# Patient Record
Sex: MALE | Race: WHITE | NOT HISPANIC OR LATINO | Employment: OTHER | URBAN - METROPOLITAN AREA
[De-identification: names, ages, dates, MRNs, and addresses within clinical notes are randomized per-mention and may not be internally consistent; named-entity substitution may affect disease eponyms.]

---

## 2018-03-19 ENCOUNTER — OFFICE VISIT CONVERTED (OUTPATIENT)
Dept: SURGERY | Facility: CLINIC | Age: 53
End: 2018-03-19
Attending: UROLOGY

## 2018-04-27 ENCOUNTER — PROCEDURE VISIT CONVERTED (OUTPATIENT)
Dept: SURGERY | Facility: CLINIC | Age: 53
End: 2018-04-27
Attending: UROLOGY

## 2019-01-11 ENCOUNTER — OFFICE VISIT CONVERTED (OUTPATIENT)
Dept: CARDIOLOGY | Facility: CLINIC | Age: 54
End: 2019-01-11
Attending: INTERNAL MEDICINE

## 2019-01-11 ENCOUNTER — CONVERSION ENCOUNTER (OUTPATIENT)
Dept: CARDIOLOGY | Facility: CLINIC | Age: 54
End: 2019-01-11

## 2019-05-13 ENCOUNTER — HOSPITAL ENCOUNTER (OUTPATIENT)
Dept: CT IMAGING | Facility: HOSPITAL | Age: 54
Discharge: HOME | End: 2019-05-13

## 2019-05-13 LAB
CREAT BLD-MCNC: 0.9 MG/DL (ref 0.6–1.4)
GFR SERPLBLD BASED ON 1.73 SQ M-ARVRAT: >60 ML/MIN/{1.73_M2}

## 2019-05-20 ENCOUNTER — PROCEDURE VISIT CONVERTED (OUTPATIENT)
Dept: SURGERY | Facility: CLINIC | Age: 54
End: 2019-05-20
Attending: UROLOGY

## 2019-07-17 ENCOUNTER — CONVERSION ENCOUNTER (OUTPATIENT)
Dept: CARDIOLOGY | Facility: CLINIC | Age: 54
End: 2019-07-17

## 2019-07-17 ENCOUNTER — OFFICE VISIT CONVERTED (OUTPATIENT)
Dept: CARDIOLOGY | Facility: CLINIC | Age: 54
End: 2019-07-17
Attending: INTERNAL MEDICINE

## 2019-10-01 ENCOUNTER — PROCEDURE VISIT CONVERTED (OUTPATIENT)
Dept: SURGERY | Facility: CLINIC | Age: 54
End: 2019-10-01
Attending: UROLOGY

## 2019-10-01 ENCOUNTER — HOSPITAL ENCOUNTER (OUTPATIENT)
Dept: SURGERY | Facility: CLINIC | Age: 54
Discharge: HOME | End: 2019-10-01

## 2020-01-30 ENCOUNTER — OFFICE VISIT CONVERTED (OUTPATIENT)
Dept: CARDIOLOGY | Facility: CLINIC | Age: 55
End: 2020-01-30
Attending: INTERNAL MEDICINE

## 2020-03-10 ENCOUNTER — OFFICE VISIT CONVERTED (OUTPATIENT)
Dept: UROLOGY | Facility: CLINIC | Age: 55
End: 2020-03-10
Attending: UROLOGY

## 2020-10-20 ENCOUNTER — PROCEDURE VISIT CONVERTED (OUTPATIENT)
Dept: UROLOGY | Facility: CLINIC | Age: 55
End: 2020-10-20
Attending: UROLOGY

## 2020-10-20 ENCOUNTER — HOSPITAL ENCOUNTER (OUTPATIENT)
Dept: SURGERY | Facility: CLINIC | Age: 55
Discharge: HOME | End: 2020-10-20
Attending: UROLOGY

## 2021-02-04 ENCOUNTER — OFFICE VISIT CONVERTED (OUTPATIENT)
Dept: CARDIOLOGY | Facility: CLINIC | Age: 56
End: 2021-02-04
Attending: INTERNAL MEDICINE

## 2021-05-10 NOTE — PROCEDURES
Procedure Note      Patient Name: Mahesh Cole   Patient ID: [id number]   Sex: Male   YOB: 1965    Primary Care Provider: Velma KELLEY   Referring Provider: Velma KELLEY    Visit Date: October 20, 2020    Provider: Bentley Araujo MD   Location: [facility]   Location Address: [address] [place] [address]  [id number]   Location Phone: [phone]          Cystoscopy Procedure:  The patients urine was viewe d under a microscope during his clinical visit: no RBC present, no WBC present, no Bacteria present.          PROCEDURE: Flexible cystoscope was passed per urethra into the bladder without difficulty after proper consent.     2cm  prostate    Some scar tissue posteriorly.  Normal otherwise    The bladder was inspected in a systematic meridian fashion. There were no tumors, lesions, stones, or other abnormalities noted within the bladder. Of note, there was no increased vascularity as well. Both ureteral orifices were identified and were normal in appearance. The flexible cystoscope was removed. The patient tolerated the procedure well.    History of high risk TCC    12/19 CT abdomen/pelvis with and withoutleft adrenal myolipoma 2.7 cm.  Negative otherwise  10/19 cystoscopynegative  5/19 CT urogram2.7 cm left myelo lipoma.  Negative otherwise    8/13  pTahigh-grade TCC  4/13  pTa high-grade TCC  2012 pT1 high-grade TCCno muscle    Patient has had 3 induction courses of BCG in the past.    Non-smoker           Assessment  · History of bladder cancer     V10.51/Z85.51      Plan  · Orders  o Cystoscopy (08016) - V10.51/Z85.51 - 10/20/2020  o Urine cytology (16307) - V10.51/Z85.51 - 10/20/2020  · Medications  o Medications have been Reconciled  o Transition of Care or Provider Policy  · Instructions  o We will follow up in one year or sooner if needed.  o Electronically Identified Patient Education Materials Provided Electronically       Urine cytology  today    Follow-up in 1 year for office cystoscopy             Electronically Signed by: Bentley Araujo MD -Author on October 20, 2020 10:57:57 AM

## 2021-05-14 VITALS
SYSTOLIC BLOOD PRESSURE: 132 MMHG | BODY MASS INDEX: 35.78 KG/M2 | HEIGHT: 73 IN | DIASTOLIC BLOOD PRESSURE: 84 MMHG | WEIGHT: 270 LBS | HEART RATE: 76 BPM

## 2021-05-14 VITALS — BODY MASS INDEX: 35.78 KG/M2 | RESPIRATION RATE: 18 BRPM | WEIGHT: 270 LBS | HEIGHT: 73 IN

## 2021-05-14 NOTE — PROGRESS NOTES
"   Progress Note      Patient Name: Mahesh Cole   Patient ID: [id number]   Sex: Male   YOB: 1965    Primary Care Provider: Velma KELLEY   Referring Provider: Velma KELLEY    Visit Date: February 4, 2021    Provider: Stefan Swanson MD   Location: [facility]   Location Address: [address] [place] [address], [address]   [place]  [id number]   Location Phone: [phone]          Chief Complaint     PVCs.       History Of Present Illness  REFERRING CARE PROVIDER: Velma KELLEY   Mahesh Cole is a 55 year old /White male with history of hypertension and PVCs. He is symptomatically stable, no complaints.   PAST MEDICAL HISTORY: Positive for hypertension and PVCs.   PSYCHOSOCIAL HISTORY: Denies alcohol use. Denies tobacco use.   CURRENT MEDICATIONS: Medications have been reviewed and are as stated.      ALLERGIES: No known drug allergies.       Review of Systems  · Cardiovascular  o Denies  o : palpitations (fast, fluttering, or skipping beats), swelling (feet, ankles, hands), shortness of breath while walking or lying flat, chest pain or angina pectoris   · Respiratory  o Denies  o : chronic or frequent cough      Vitals  Date Time BP Position Site L\R Cuff Size HR RR TEMP (F) WT  HT  BMI kg/m2 BSA m2 O2 Sat FR L/min FiO2 HC       02/04/2021 08:39 /84 Sitting    76 - R   270lbs 0oz 6'  1\" 35.62 2.51             Physical Examination  · Constitutional  o Appearance  o : Awake, alert, in no acute distress.   · Eyes  o Conjunctivae  o : Normal.  · Ears, Nose, Mouth and Throat  o Oral Cavity  o :   § Oral Mucosa  § : Normal.  · Neck  o Inspection/Palpation  o : No JVD. Good carotid upstroke. No thyromegaly.  · Respiratory  o Respiratory  o : Good respiratory effort. Clear to percussion and auscultation.  · Cardiovascular  o Heart  o :   § Auscultation of Heart  § : S1, S2 normal. Regular rate and rhythm without murmurs, gallops, or rubs.  o Peripheral Vascular " System  o :   § Extremities  § : Good femoral and pedal pulses. No pedal edema.  · Gastrointestinal  o Abdominal Examination  o : Soft. No tenderness or masses felt. No hepatosplenomegaly. Abdominal aorta is not palpable.  · EKG  o EKG  o : Performed in the office today.  o Indications  o : PVCs.  o Results  o : Normal sinus rhythm.  o Comparison  o : No change from prior EKGs.          Assessment     ASSESSMENT & PLAN:    PVCs, symptomatically stable.  Continue with current beta blocker and calcium channel blocker.  Encouraged exercise and weight loss.     Stefan Swanson MD  JH/rt             Electronically Signed by: Liliam Pickett-, Other -Author on February 19, 2021 09:20:28 AM  Electronically Co-signed by: Stefan Swanson MD -Reviewer on February 26, 2021 11:03:27 AM

## 2021-05-15 VITALS
BODY MASS INDEX: 36.18 KG/M2 | SYSTOLIC BLOOD PRESSURE: 138 MMHG | WEIGHT: 273 LBS | DIASTOLIC BLOOD PRESSURE: 102 MMHG | HEIGHT: 73 IN | HEART RATE: 76 BPM

## 2021-05-15 VITALS — HEIGHT: 73 IN | RESPIRATION RATE: 17 BRPM | WEIGHT: 270.25 LBS | BODY MASS INDEX: 35.82 KG/M2

## 2021-05-15 VITALS
HEART RATE: 74 BPM | HEIGHT: 73 IN | BODY MASS INDEX: 35.78 KG/M2 | DIASTOLIC BLOOD PRESSURE: 84 MMHG | WEIGHT: 270 LBS | SYSTOLIC BLOOD PRESSURE: 132 MMHG

## 2021-05-16 VITALS
HEIGHT: 73 IN | WEIGHT: 276 LBS | HEART RATE: 74 BPM | DIASTOLIC BLOOD PRESSURE: 96 MMHG | SYSTOLIC BLOOD PRESSURE: 132 MMHG | BODY MASS INDEX: 36.58 KG/M2

## 2021-05-16 VITALS
HEIGHT: 73 IN | BODY MASS INDEX: 35.39 KG/M2 | WEIGHT: 267 LBS | SYSTOLIC BLOOD PRESSURE: 128 MMHG | DIASTOLIC BLOOD PRESSURE: 72 MMHG

## 2021-11-29 PROBLEM — N28.9 RENAL LESION: Status: ACTIVE | Noted: 2021-11-29

## 2021-11-29 PROBLEM — Z85.51 HISTORY OF BLADDER CANCER: Status: ACTIVE | Noted: 2021-11-29

## 2021-11-29 NOTE — PROGRESS NOTES
Cystoscopy Procedure:  The patients urine was viewe d under a microscope during his clinical visit: no RBC present, no WBC present, no Bacteria present.           PROCEDURE: Flexible cystoscope was passed per urethra into the bladder without difficulty after proper consent.     2cm  prostate    Some scar tissue posteriorly.    No signs of pathology.    The bladder was inspected in a systematic meridian fashion. There were no tumors, lesions, stones, or other abnormalities noted within the bladder. Of note, there was no increased vascularity as well. Both ureteral orifices were identified and were normal in appearance. The flexible cystoscope was removed. The patient tolerated the procedure well.        History of high risk TCC    12/19 CT abdomen/pelvis with and withoutleft adrenal myolipoma 2.7 cm.  Negative otherwise  10/19 cystoscopynegative  5/19 CT urogram2.7 cm left myelo lipoma.  Negative otherwise    8/13  pTahigh-grade TCC  4/13  pTa high-grade TCC  2012 pT1 high-grade TCCno muscle    Patient has had 3 induction courses of BCG in the past.    Non-smoker     PLAN      Urine cytology today    Follow-up in 1 year for office cystoscopy with CT urogram before

## 2021-11-30 ENCOUNTER — OFFICE VISIT (OUTPATIENT)
Dept: UROLOGY | Facility: CLINIC | Age: 56
End: 2021-11-30

## 2021-11-30 DIAGNOSIS — Z85.51 HISTORY OF BLADDER CANCER: Primary | ICD-10-CM

## 2021-11-30 LAB
BILIRUB BLD-MCNC: NEGATIVE MG/DL
CLARITY, POC: CLEAR
COLOR UR: YELLOW
EXPIRATION DATE: ABNORMAL
GLUCOSE UR STRIP-MCNC: NEGATIVE MG/DL
KETONES UR QL: NEGATIVE
LEUKOCYTE EST, POC: NEGATIVE
Lab: ABNORMAL
NITRITE UR-MCNC: NEGATIVE MG/ML
PH UR: 6.5 [PH] (ref 5–8)
PROT UR STRIP-MCNC: NEGATIVE MG/DL
RBC # UR STRIP: NEGATIVE /UL
SP GR UR: 1.02 (ref 1–1.03)
UROBILINOGEN UR QL: ABNORMAL

## 2021-11-30 PROCEDURE — 88108 CYTOPATH CONCENTRATE TECH: CPT | Performed by: UROLOGY

## 2021-11-30 PROCEDURE — 52000 CYSTOURETHROSCOPY: CPT | Performed by: UROLOGY

## 2021-11-30 PROCEDURE — 81003 URINALYSIS AUTO W/O SCOPE: CPT | Performed by: UROLOGY

## 2021-11-30 RX ORDER — DILTIAZEM HYDROCHLORIDE 60 MG/1
CAPSULE, EXTENDED RELEASE ORAL
COMMUNITY
End: 2022-02-08 | Stop reason: SDUPTHER

## 2021-11-30 RX ORDER — CETIRIZINE HYDROCHLORIDE 10 MG/1
TABLET ORAL
COMMUNITY

## 2021-11-30 RX ORDER — DILTIAZEM HYDROCHLORIDE 180 MG/1
CAPSULE, EXTENDED RELEASE ORAL
COMMUNITY
Start: 2021-03-25 | End: 2023-02-08 | Stop reason: SDUPTHER

## 2021-11-30 RX ORDER — METOPROLOL TARTRATE 50 MG/1
50 TABLET, FILM COATED ORAL
COMMUNITY

## 2021-11-30 RX ORDER — HYDROCHLOROTHIAZIDE 12.5 MG/1
TABLET ORAL
COMMUNITY
End: 2023-03-29 | Stop reason: SDUPTHER

## 2021-12-02 LAB
CYTO UR: NORMAL
LAB AP CASE REPORT: NORMAL
LAB AP CLINICAL INFORMATION: NORMAL
LAB AP NON-GYN INTERPRETATION: NORMAL
PATH REPORT.FINAL DX SPEC: NORMAL
PATH REPORT.GROSS SPEC: NORMAL
STAT OF ADQ CVX/VAG CYTO-IMP: NORMAL

## 2021-12-16 ENCOUNTER — HOSPITAL ENCOUNTER (OUTPATIENT)
Dept: CT IMAGING | Facility: HOSPITAL | Age: 56
Discharge: HOME | End: 2021-12-16
Admitting: UROLOGY

## 2021-12-16 DIAGNOSIS — Z85.51 HISTORY OF BLADDER CANCER: ICD-10-CM

## 2021-12-16 LAB — CREAT BLDA-MCNC: 0.9 MG/DL

## 2021-12-16 PROCEDURE — 82565 ASSAY OF CREATININE: CPT

## 2021-12-16 PROCEDURE — 74178 CT ABD&PLV WO CNTR FLWD CNTR: CPT

## 2021-12-16 PROCEDURE — 0 IOPAMIDOL PER 1 ML: Performed by: UROLOGY

## 2021-12-16 RX ADMIN — IOPAMIDOL 100 ML: 755 INJECTION, SOLUTION INTRAVENOUS at 14:00

## 2021-12-20 ENCOUNTER — TELEPHONE (OUTPATIENT)
Dept: UROLOGY | Facility: CLINIC | Age: 56
End: 2021-12-20

## 2021-12-20 NOTE — TELEPHONE ENCOUNTER
----- Message from Bentley Araujo MD sent at 12/20/2021  7:05 AM EST -----  Let patient know his CT looked okay, I will see him at his follow-up appointment

## 2022-02-03 PROBLEM — I49.3 PVC (PREMATURE VENTRICULAR CONTRACTION): Status: ACTIVE | Noted: 2022-02-03

## 2022-02-08 ENCOUNTER — OFFICE VISIT (OUTPATIENT)
Dept: CARDIOLOGY | Facility: CLINIC | Age: 57
End: 2022-02-08

## 2022-02-08 VITALS
WEIGHT: 262 LBS | SYSTOLIC BLOOD PRESSURE: 124 MMHG | HEART RATE: 79 BPM | BODY MASS INDEX: 34.72 KG/M2 | HEIGHT: 73 IN | DIASTOLIC BLOOD PRESSURE: 86 MMHG

## 2022-02-08 DIAGNOSIS — I10 ESSENTIAL HYPERTENSION: ICD-10-CM

## 2022-02-08 DIAGNOSIS — I49.3 PVC (PREMATURE VENTRICULAR CONTRACTION): Primary | ICD-10-CM

## 2022-02-08 PROCEDURE — 99214 OFFICE O/P EST MOD 30 MIN: CPT | Performed by: INTERNAL MEDICINE

## 2022-02-08 RX ORDER — DIPHENOXYLATE HYDROCHLORIDE AND ATROPINE SULFATE 2.5; .025 MG/1; MG/1
TABLET ORAL
COMMUNITY

## 2022-02-08 NOTE — PROGRESS NOTES
"Chief Complaint  PVC's, Hypertension, and Obesity    Subjective    Patient is doing well no active complaints or new problems heart palpitations  Past Medical History:   Diagnosis Date   • Hx of bladder cancer    • Hypertension    • PVC (premature ventricular contraction)    • Renal lesion          Current Outpatient Medications:   •  cetirizine (ZyrTEC Allergy) 10 MG tablet, Zyrtec 10 mg oral tablet take 1 tablet (10 mg) by oral route once daily   Active, Disp: , Rfl:   •  dilTIAZem (TIAZAC) 180 MG 24 hr capsule, diltiazem HCl 180 mg oral capsule,extended release 24 hr take 1 capsule (180 mg) by oral route once daily 3/25/2021  Active, Disp: , Rfl:   •  hydroCHLOROthiazide (HYDRODIURIL) 12.5 MG tablet, hydrochlorothiazide 12.5 mg oral tablet take 1 tablet (12.5 mg) by oral route once daily   Suspended, Disp: , Rfl:   •  metFORMIN (GLUCOPHAGE) 850 MG tablet, metformin 850 mg oral tablet take 1 tablet (850 mg) by oral route 2 times per day with morning and evening meals   Active, Disp: , Rfl:   •  metoprolol tartrate (LOPRESSOR) 50 MG tablet, Take 50 mg by mouth every night at bedtime., Disp: , Rfl:   •  multivitamin (Multi-Vitamin Daily) tablet tablet, , Disp: , Rfl:   •  Omega-3 Fatty Acids (FISH OIL PO), , Disp: , Rfl:   •  vitamin D3 125 MCG (5000 UT) capsule capsule, TAKE ONE CAPSULE BY MOUTH ONCE A DAY, Disp: , Rfl:     Medications Discontinued During This Encounter   Medication Reason   • dilTIAZem SR (CARDIZEM SR) 60 MG 12 hr capsule Duplicate order     No Known Allergies     Social History     Tobacco Use   • Smoking status: Never Smoker   • Smokeless tobacco: Never Used   Vaping Use   • Vaping Use: Never used   Substance Use Topics   • Alcohol use: Never   • Drug use: Never       History reviewed. No pertinent family history.     Objective     /86   Pulse 79   Ht 185.4 cm (73\")   Wt 119 kg (262 lb)   BMI 34.57 kg/m²       Physical Exam    General Appearance:   · no acute distress  · Alert and " oriented x3  HENT:   · lips not cyanotic  · Atraumatic  Neck:  · No jvd   · supple  Respiratory:  · no respiratory distress  · normal breath sounds  · no rales  Cardiovascular:  · Regular rate and rhythm  · no S3, no S4   · no murmur  · no rub  Extremities  · No cyanosis  · lower extremity edema: none    Skin:   · warm, dry  · No rashes      Result Review :     No results found for: PROBNP  CMP    CMP 12/16/21   Creatinine 0.90      Comments are available for some flowsheets but are not being displayed.             Diagnoses and all orders for this visit:    1. PVC (premature ventricular contraction) (Primary)  Assessment & Plan:  Patient symptomatic control continue with diltiazem 180 and Lopressor 50 twice daily dosing      2. Essential hypertension  Assessment & Plan:  Blood pressure well controlled continue with diltiazem 180 daily and metoprolol 50 twice daily  Counseled patient on  • low-sodium diet of less than 2 g  • Aerobic activity 30 minutes a day 5 times a week  • Weight loss                Follow Up     Return in about 1 year (around 2/8/2023) for Follow with Shanae Choudhary, EKG with F/U.          Patient was given instructions and counseling regarding his condition or for health maintenance advice. Please see specific information pulled into the AVS if appropriate.

## 2022-02-08 NOTE — ASSESSMENT & PLAN NOTE
Blood pressure well controlled continue with diltiazem 180 daily and metoprolol 50 twice daily  Counseled patient on  • low-sodium diet of less than 2 g  • Aerobic activity 30 minutes a day 5 times a week  • Weight loss

## 2022-10-21 DIAGNOSIS — N28.9 RENAL LESION: Primary | ICD-10-CM

## 2022-11-10 ENCOUNTER — HOSPITAL ENCOUNTER (OUTPATIENT)
Dept: CT IMAGING | Facility: HOSPITAL | Age: 57
Discharge: HOME | End: 2022-11-10
Admitting: UROLOGY

## 2022-11-10 DIAGNOSIS — N28.9 RENAL LESION: ICD-10-CM

## 2022-11-10 LAB
CREAT BLDA-MCNC: 0.8 MG/DL
EGFRCR SERPLBLD CKD-EPI 2021: 102.6 ML/MIN/1.73

## 2022-11-10 PROCEDURE — 74178 CT ABD&PLV WO CNTR FLWD CNTR: CPT

## 2022-11-10 PROCEDURE — 0 IOPAMIDOL PER 1 ML: Performed by: UROLOGY

## 2022-11-10 PROCEDURE — 82565 ASSAY OF CREATININE: CPT

## 2022-11-10 RX ADMIN — IOPAMIDOL 100 ML: 755 INJECTION, SOLUTION INTRAVENOUS at 15:50

## 2022-12-14 ENCOUNTER — TELEPHONE (OUTPATIENT)
Dept: UROLOGY | Facility: CLINIC | Age: 57
End: 2022-12-14

## 2022-12-14 NOTE — TELEPHONE ENCOUNTER
----- Message from James Yoon RN sent at 12/14/2022  8:32 AM EST -----  WILL YOU SEE IF PT WILL MOVE TO 0930 FRIDAY

## 2022-12-15 NOTE — PROGRESS NOTES
Cystoscopy Procedure:  The patients urine was viewe d under a microscope during his clinical visit: no RBC present, no WBC present, no Bacteria present.           PROCEDURE: Flexible cystoscope was passed per urethra into the bladder without difficulty after proper consent.     2cm  prostate    Some scar tissue posteriorly.    No signs of pathology.    No changes from his last scope.    The bladder was inspected in a systematic meridian fashion. There were no tumors, lesions, stones, or other abnormalities noted within the bladder. Of note, there was no increased vascularity as well. Both ureteral orifices were identified and were normal in appearance. The flexible cystoscope was removed. The patient tolerated the procedure well.        History of high risk TCC    11/10/2022 CT uro-- 2 mm nonobstructing stone upper pole right kidney, delayed phase okay, images reviewed    12/21 CT urogram  -delayed phase okay, negative,       12/19 CT abdomen/pelvis with and withoutleft adrenal myolipoma 2.7 cm.  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  Negative otherwise    8/13  pTahigh-grade TCC  4/13  pTa high-grade TCC  2012 pT1 high-grade TCCno muscle    Patient has had 3 induction courses of BCG in the past.    Non-smoker     PLAN      Follow-up in 1 year with cystoscopy

## 2022-12-16 ENCOUNTER — PROCEDURE VISIT (OUTPATIENT)
Dept: UROLOGY | Facility: CLINIC | Age: 57
End: 2022-12-16

## 2022-12-16 DIAGNOSIS — Z85.51 HISTORY OF BLADDER CANCER: Primary | ICD-10-CM

## 2022-12-16 PROCEDURE — 52000 CYSTOURETHROSCOPY: CPT | Performed by: UROLOGY

## 2023-02-08 ENCOUNTER — OFFICE VISIT (OUTPATIENT)
Dept: OTOLARYNGOLOGY | Facility: CLINIC | Age: 58
End: 2023-02-08
Payer: COMMERCIAL

## 2023-02-08 ENCOUNTER — OFFICE VISIT (OUTPATIENT)
Dept: CARDIOLOGY | Facility: CLINIC | Age: 58
End: 2023-02-08
Payer: COMMERCIAL

## 2023-02-08 VITALS
BODY MASS INDEX: 34.33 KG/M2 | HEIGHT: 73 IN | HEART RATE: 76 BPM | SYSTOLIC BLOOD PRESSURE: 147 MMHG | WEIGHT: 259 LBS | DIASTOLIC BLOOD PRESSURE: 90 MMHG

## 2023-02-08 VITALS — TEMPERATURE: 97.9 F | BODY MASS INDEX: 34.33 KG/M2 | WEIGHT: 259 LBS | HEIGHT: 73 IN

## 2023-02-08 DIAGNOSIS — H93.8X1 EAR PRESSURE, RIGHT: Primary | ICD-10-CM

## 2023-02-08 DIAGNOSIS — R51.9 RIGHT SIDED FACIAL PAIN: ICD-10-CM

## 2023-02-08 DIAGNOSIS — I10 ESSENTIAL HYPERTENSION: ICD-10-CM

## 2023-02-08 DIAGNOSIS — R44.8 FACIAL PRESSURE: ICD-10-CM

## 2023-02-08 DIAGNOSIS — I49.3 PVC (PREMATURE VENTRICULAR CONTRACTION): Primary | ICD-10-CM

## 2023-02-08 PROBLEM — J30.9 ALLERGIC RHINITIS: Status: ACTIVE | Noted: 2023-02-08

## 2023-02-08 PROBLEM — E11.9 DIABETES MELLITUS, TYPE II: Status: ACTIVE | Noted: 2023-02-08

## 2023-02-08 PROCEDURE — 99204 OFFICE O/P NEW MOD 45 MIN: CPT | Performed by: NURSE PRACTITIONER

## 2023-02-08 PROCEDURE — 99214 OFFICE O/P EST MOD 30 MIN: CPT | Performed by: NURSE PRACTITIONER

## 2023-02-08 RX ORDER — DILTIAZEM HYDROCHLORIDE 180 MG/1
180 CAPSULE, EXTENDED RELEASE ORAL DAILY
Qty: 90 CAPSULE | Refills: 3 | Status: SHIPPED | OUTPATIENT
Start: 2023-02-08

## 2023-02-08 RX ORDER — NAPROXEN 500 MG/1
500 TABLET ORAL 2 TIMES DAILY PRN
COMMUNITY
Start: 2023-01-09

## 2023-02-08 NOTE — PROGRESS NOTES
Patient Name: Mahesh Cole   Visit Date: 02/08/2023   Patient ID: [id number]  Provider: WARREN Li    Sex: male  Location: [facility]   YOB: 1965  Location Address: [address] [place] [address], [address],?[place]?[id number]    Primary Care Provider Velma Santos APRN  Location Phone: [phone]    Referring Provider: WARREN Plasencia        Chief Complaint  Ear Problem    Subjective   Mahesh Cole is a 58 y.o. male who presents to [facility] today as a consult from WARREN Plasencia for evaluation of his right ear.  Patient states that around the end of October he began to have pain and pressure in the right ear with pain that would radiate down the right side of his neck.  He states that he has had issues with fluid buildup on the right ear in the past but is typically treated with antibiotics and steroids and this clears up quickly.  However this time it has persisted.  He states that he is most bothered by the pain and pressure that occurs anytime he sneezes, coughs or bends over.  He describes this as pressure and pain in the right ear that radiates across to the right side of his head through his forehead and right maxillary sinus area.  He states he tries to avoid sneezing because of this pain and pressure.  He reports he has never experienced this before.  He has had some rhinorrhea that is mainly clear with a 1 week period in December at that it was dark-colored.  He states he has been treated with antibiotics but continues to have the symptoms in the ER.  He does feel like his hearing is potentially muffled on that right side but overall feels like he hears okay.  He denies any difficulty breathing, nasal obstruction issues or TMJ issues.      Current Outpatient Medications on File Prior to Visit   Medication Sig   • cetirizine (zyrTEC) 10 MG tablet Zyrtec 10 mg oral tablet take 1 tablet (10 mg) by oral route  "once daily   Active   • dilTIAZem (TIAZAC) 180 MG 24 hr capsule Take 1 capsule by mouth Daily.   • hydroCHLOROthiazide (HYDRODIURIL) 12.5 MG tablet hydrochlorothiazide 12.5 mg oral tablet take 1 tablet (12.5 mg) by oral route once daily   Suspended   • metFORMIN (GLUCOPHAGE) 850 MG tablet metformin 850 mg oral tablet take 1 tablet (850 mg) by oral route 2 times per day with morning and evening meals   Active   • metoprolol tartrate (LOPRESSOR) 50 MG tablet Take 50 mg by mouth every night at bedtime.   • multivitamin (THERAGRAN) tablet tablet    • naproxen (NAPROSYN) 500 MG tablet Take 500 mg by mouth 2 (Two) Times a Day As Needed. for pain   • Omega-3 Fatty Acids (FISH OIL PO)    • vitamin D3 125 MCG (5000 UT) capsule capsule TAKE ONE CAPSULE BY MOUTH ONCE A DAY     No current facility-administered medications on file prior to visit.        Social History     Tobacco Use   • Smoking status: Never   • Smokeless tobacco: Never   Vaping Use   • Vaping Use: Never used   Substance Use Topics   • Alcohol use: Never   • Drug use: Never       Objective     Vital Signs:   Temp 97.9 °F (36.6 °C) (Temporal)   Ht 185.4 cm (73\")   Wt 117 kg (259 lb)   BMI 34.17 kg/m²       Physical Exam  Constitutional:       General: He is not in acute distress.     Appearance: Normal appearance. He is not ill-appearing.   HENT:      Head: Normocephalic and atraumatic.      Jaw: There is normal jaw occlusion. No tenderness or pain on movement.      Salivary Glands: Right salivary gland is not diffusely enlarged or tender. Left salivary gland is not diffusely enlarged or tender.      Right Ear: Tympanic membrane, ear canal and external ear normal.      Left Ear: Tympanic membrane, ear canal and external ear normal.      Nose: Nose normal. No septal deviation.      Right Sinus: No maxillary sinus tenderness or frontal sinus tenderness.      Left Sinus: No maxillary sinus tenderness or frontal sinus tenderness.      Mouth/Throat:      Lips: " Pink. No lesions.      Mouth: Mucous membranes are moist. No oral lesions.      Dentition: Normal dentition.      Tongue: No lesions.      Palate: No mass and lesions.      Pharynx: Oropharynx is clear. Uvula midline.      Tonsils: No tonsillar exudate.   Eyes:      Extraocular Movements: Extraocular movements intact.      Conjunctiva/sclera: Conjunctivae normal.      Pupils: Pupils are equal, round, and reactive to light.   Neck:      Thyroid: No thyroid mass, thyromegaly or thyroid tenderness.      Trachea: Trachea normal.   Pulmonary:      Effort: Pulmonary effort is normal. No respiratory distress.   Musculoskeletal:         General: Normal range of motion.      Cervical back: Normal range of motion and neck supple. No tenderness.   Lymphadenopathy:      Cervical: No cervical adenopathy.   Skin:     General: Skin is warm and dry.   Neurological:      General: No focal deficit present.      Mental Status: He is alert and oriented to person, place, and time.      Cranial Nerves: No cranial nerve deficit.      Motor: No weakness.      Gait: Gait normal.   Psychiatric:         Mood and Affect: Mood normal.         Behavior: Behavior normal.         Thought Content: Thought content normal.         Judgment: Judgment normal.               Result Review :              Assessment and Plan    Diagnoses and all orders for this visit:    1. Ear pressure, right (Primary)    2. Facial pressure  -     CT Maxillofacial Without Contrast; Future    3. Right sided facial pain  -     CT Maxillofacial Without Contrast; Future    On examination today the right middle ear appears well aerated.  He does not have any sign of fluid or infection.  I suspect he may be having eustachian tube dysfunction issues but due to his symptoms of extreme facial pain and pressure radiating across the maxillary sinuses and forehead I would like to get a CT scan of the sinuses for further evaluation.  Additionally when he returns we will consider  audiogram and tympanogram testing as well.  I will have him use his nasal spray and polarization techniques to try to open up the eustachian tube to see if this helps with his symptoms.       Follow Up   No follow-ups on file.  Patient was given instructions and counseling regarding his condition or for health maintenance advice. Please see specific information pulled into the AVS if appropriate.      Saira Tenorio, APRN

## 2023-02-08 NOTE — PROGRESS NOTES
Chief Complaint  Follow-up and Hypertension    Subjective            History of Present Illness  Mahesh Cole is a 58-year-old white/ male patient who presents to the office today for follow-up.  He has hypertension and PVCs.  He reports compliance with his medications. He has been battling an ongoing ear infection for the past month, has been referred to ENT and is actually going to be seen later on today. He is attributing his elevated blood pressure today to this ongoing issue.   He denies any chest pain, shortness of breath, lightheadedness/dizziness, palpitations, or edema.     PMH  Past Medical History:   Diagnosis Date   • Hx of bladder cancer    • Hypertension    • PVC (premature ventricular contraction)    • Renal lesion          ALLERGY  No Known Allergies       SURGICALHX  Past Surgical History:   Procedure Laterality Date   • BLADDER SURGERY            SOC  Social History     Socioeconomic History   • Marital status:    Tobacco Use   • Smoking status: Never   • Smokeless tobacco: Never   Vaping Use   • Vaping Use: Never used   Substance and Sexual Activity   • Alcohol use: Never   • Drug use: Never         FAMHX  History reviewed. No pertinent family history.       MEDSIGONLY  Current Outpatient Medications on File Prior to Visit   Medication Sig   • cetirizine (zyrTEC) 10 MG tablet Zyrtec 10 mg oral tablet take 1 tablet (10 mg) by oral route once daily   Active   • dilTIAZem (TIAZAC) 180 MG 24 hr capsule diltiazem HCl 180 mg oral capsule,extended release 24 hr take 1 capsule (180 mg) by oral route once daily 3/25/2021  Active   • hydroCHLOROthiazide (HYDRODIURIL) 12.5 MG tablet hydrochlorothiazide 12.5 mg oral tablet take 1 tablet (12.5 mg) by oral route once daily   Suspended   • metFORMIN (GLUCOPHAGE) 850 MG tablet metformin 850 mg oral tablet take 1 tablet (850 mg) by oral route 2 times per day with morning and evening meals   Active   • metoprolol tartrate (LOPRESSOR) 50 MG tablet  "Take 50 mg by mouth every night at bedtime.   • multivitamin (THERAGRAN) tablet tablet    • naproxen (NAPROSYN) 500 MG tablet Take 500 mg by mouth 2 (Two) Times a Day As Needed. for pain   • Omega-3 Fatty Acids (FISH OIL PO)    • vitamin D3 125 MCG (5000 UT) capsule capsule TAKE ONE CAPSULE BY MOUTH ONCE A DAY     No current facility-administered medications on file prior to visit.       Objective   /90   Pulse 76   Ht 185.4 cm (73\")   Wt 117 kg (259 lb)   BMI 34.17 kg/m²       Physical Exam  Constitutional:       Appearance: He is obese.   HENT:      Head: Normocephalic.   Neck:      Vascular: No carotid bruit.   Cardiovascular:      Rate and Rhythm: Normal rate and regular rhythm.      Pulses: Normal pulses.      Heart sounds: Normal heart sounds. No murmur heard.  Pulmonary:      Effort: Pulmonary effort is normal.      Breath sounds: Normal breath sounds.   Musculoskeletal:      Cervical back: Neck supple.      Right lower leg: No edema.      Left lower leg: No edema.   Skin:     General: Skin is dry.      Capillary Refill: Capillary refill takes less than 2 seconds.   Neurological:      Mental Status: He is alert and oriented to person, place, and time.   Psychiatric:         Behavior: Behavior normal.       Result Review :   The following data was reviewed by: WARREN Malagon on 02/08/2023:  No results found for: PROBNP  CMP    CMP 11/10/22   Creatinine 0.80   eGFR 102.6      Comments are available for some flowsheets but are not being displayed.            No results found for: TSH   No results found for: FREET4   No results found for: DDIMERQUANT  No results found for: MG   No results found for: DIGOXIN   No results found for: TROPONINT               Assessment and Plan    Diagnoses and all orders for this visit:    1. PVC (premature ventricular contraction) (Primary)  Symptomatically stable, without recurrent events, continue diltiazem 180 mg daily and metoprolol 50 mg nightly. Avoid " caffeine products.     2. Essential hypertension  Elevated today, attributed to his month long ear infection. I asked him once ear infection has resolved to monitor blood pressure closely and notify office if persistently out of range.   Continue hydrochlorothiazide 12.5 mg daily.     Other orders  -     dilTIAZem (TIAZAC) 180 MG 24 hr capsule; Take 1 capsule by mouth Daily.  Dispense: 90 capsule; Refill: 3            Follow Up   Return in about 1 year (around 2/8/2024) for Follow up with Dr Swanson.    Patient was given instructions and counseling regarding his condition or for health maintenance advice. Please see specific information pulled into the AVS if appropriate.     Mahesh CHIKIS Cole  reports that he has never smoked. He has never used smokeless tobacco.         Shanae Choudhary, APRN  02/08/23  08:13 EST    Dictated Utilizing Dragon Dictation

## 2023-03-15 ENCOUNTER — HOSPITAL ENCOUNTER (OUTPATIENT)
Dept: CT IMAGING | Facility: HOSPITAL | Age: 58
Discharge: HOME | End: 2023-03-15
Admitting: NURSE PRACTITIONER
Payer: COMMERCIAL

## 2023-03-15 DIAGNOSIS — R44.8 FACIAL PRESSURE: ICD-10-CM

## 2023-03-15 DIAGNOSIS — R51.9 RIGHT SIDED FACIAL PAIN: ICD-10-CM

## 2023-03-15 PROCEDURE — 70486 CT MAXILLOFACIAL W/O DYE: CPT

## 2023-03-29 ENCOUNTER — OFFICE VISIT (OUTPATIENT)
Dept: OTOLARYNGOLOGY | Facility: CLINIC | Age: 58
End: 2023-03-29
Payer: COMMERCIAL

## 2023-03-29 VITALS
SYSTOLIC BLOOD PRESSURE: 137 MMHG | WEIGHT: 261.8 LBS | HEIGHT: 73 IN | HEART RATE: 94 BPM | DIASTOLIC BLOOD PRESSURE: 92 MMHG | BODY MASS INDEX: 34.7 KG/M2 | TEMPERATURE: 97 F

## 2023-03-29 DIAGNOSIS — R44.8 FACIAL PRESSURE: ICD-10-CM

## 2023-03-29 DIAGNOSIS — R51.9 RIGHT SIDED FACIAL PAIN: ICD-10-CM

## 2023-03-29 DIAGNOSIS — H93.8X1 EAR PRESSURE, RIGHT: Primary | ICD-10-CM

## 2023-03-29 PROCEDURE — 31575 DIAGNOSTIC LARYNGOSCOPY: CPT | Performed by: NURSE PRACTITIONER

## 2023-03-29 PROCEDURE — 99214 OFFICE O/P EST MOD 30 MIN: CPT | Performed by: NURSE PRACTITIONER

## 2023-03-29 RX ORDER — FLUTICASONE PROPIONATE 50 MCG
SPRAY, SUSPENSION (ML) NASAL
COMMUNITY
Start: 2023-02-17

## 2023-03-29 RX ORDER — HYDROCHLOROTHIAZIDE 12.5 MG/1
CAPSULE, GELATIN COATED ORAL
COMMUNITY
Start: 2023-03-17

## 2023-03-29 NOTE — PROGRESS NOTES
Patient Name: Mahesh Cole   Visit Date: 03/29/2023   Patient ID: [id number]  Provider: WARREN Li    Sex: male  Location: [facility]   YOB: 1965  Location Address: [address] [place] [address], [address],?[place]?[address]    Primary Care Provider Velma Santos APRN  Location Phone: [phone]    Referring Provider: No ref. provider found        Chief Complaint  5 week follow up CT Scan    Subjective          Mahesh Cole is a 58 y.o. male who presents to [facility] for a follow-up visit of right-sided facial pain and pressure as well as right-sided ear pressure and otalgia.  Patient states his symptoms have continued.  He does feel like the nasal sprays he has been using have been somewhat helpful.  He states his symptoms mainly occur anytime he bends over he will feel a pressure that will radiate across the right side of his face involving the ear and sometimes down the right side of his neck.  I did order a CT scan of his sinuses that was performed on 3/15/2023.  This shows some minimal mucosal inflammatory thickening noted in the left maxillary sinus with otherwise clear maxillary sinuses.  Ostiomeatal complexes are patent bilaterally in the bilateral frontal ethmoid and sphenoid sinuses are all clear.  There is a right-sided cody bullosa noted and moderate leftward deviation of the nasal septum with a nasal septal spur noted on the left.  The nasal cavity otherwise is unremarkable and the middle ear cavities and mastoid air cells are clear bilaterally.  The visualized facial soft tissues appear normal as well as the visualized brain being normal.      Current Outpatient Medications on File Prior to Visit   Medication Sig   • cetirizine (zyrTEC) 10 MG tablet Zyrtec 10 mg oral tablet take 1 tablet (10 mg) by oral route once daily   Active   • dilTIAZem (TIAZAC) 180 MG 24 hr capsule Take 1 capsule by mouth Daily.   •  "fluticasone (FLONASE) 50 MCG/ACT nasal spray Inhale TWO SPRAYS IN EACH NOSTRIL ONCE A DAY AS DIRECTED   • hydroCHLOROthiazide (MICROZIDE) 12.5 MG capsule TAKE ONE CAPSULE BY MOUTH ONCE A DAY   • metFORMIN (GLUCOPHAGE) 850 MG tablet metformin 850 mg oral tablet take 1 tablet (850 mg) by oral route 2 times per day with morning and evening meals   Active   • metoprolol tartrate (LOPRESSOR) 50 MG tablet Take 1 tablet by mouth every night at bedtime.   • multivitamin (THERAGRAN) tablet tablet    • naproxen (NAPROSYN) 500 MG tablet Take 1 tablet by mouth 2 (Two) Times a Day As Needed. for pain   • Omega-3 Fatty Acids (FISH OIL PO)    • vitamin D3 125 MCG (5000 UT) capsule capsule TAKE ONE CAPSULE BY MOUTH ONCE A DAY   • [DISCONTINUED] hydroCHLOROthiazide (HYDRODIURIL) 12.5 MG tablet hydrochlorothiazide 12.5 mg oral tablet take 1 tablet (12.5 mg) by oral route once daily   Suspended     No current facility-administered medications on file prior to visit.        Social History     Tobacco Use   • Smoking status: Never   • Smokeless tobacco: Never   Vaping Use   • Vaping Use: Never used   Substance Use Topics   • Alcohol use: Never   • Drug use: Never        Objective     Vital Signs:   /92 (BP Location: Right arm, Patient Position: Sitting, Cuff Size: Adult)   Pulse 94   Temp 97 °F (36.1 °C) (Temporal)   Ht 185.4 cm (73\")   Wt 119 kg (261 lb 12.8 oz)   BMI 34.54 kg/m²       Physical Exam  Constitutional:       General: He is not in acute distress.     Appearance: Normal appearance. He is not ill-appearing.   HENT:      Head: Normocephalic and atraumatic.      Jaw: There is normal jaw occlusion. No tenderness or pain on movement.      Salivary Glands: Right salivary gland is not diffusely enlarged or tender. Left salivary gland is not diffusely enlarged or tender.      Right Ear: Tympanic membrane, ear canal and external ear normal.      Left Ear: Tympanic membrane, ear canal and external ear normal.      Nose: " Nose normal. No septal deviation.      Right Sinus: No maxillary sinus tenderness or frontal sinus tenderness.      Left Sinus: No maxillary sinus tenderness or frontal sinus tenderness.      Mouth/Throat:      Lips: Pink. No lesions.      Mouth: Mucous membranes are moist. No oral lesions.      Dentition: Normal dentition.      Tongue: No lesions.      Palate: No mass and lesions.      Pharynx: Oropharynx is clear. Uvula midline.      Tonsils: No tonsillar exudate.   Eyes:      Extraocular Movements: Extraocular movements intact.      Conjunctiva/sclera: Conjunctivae normal.      Pupils: Pupils are equal, round, and reactive to light.   Neck:      Thyroid: No thyroid mass, thyromegaly or thyroid tenderness.      Trachea: Trachea normal.   Pulmonary:      Effort: Pulmonary effort is normal. No respiratory distress.   Musculoskeletal:         General: Normal range of motion.      Cervical back: Normal range of motion and neck supple. No tenderness.   Lymphadenopathy:      Cervical: No cervical adenopathy.   Skin:     General: Skin is warm and dry.   Neurological:      General: No focal deficit present.      Mental Status: He is alert and oriented to person, place, and time.      Cranial Nerves: No cranial nerve deficit.      Motor: No weakness.      Gait: Gait normal.   Psychiatric:         Mood and Affect: Mood normal.         Behavior: Behavior normal.         Thought Content: Thought content normal.         Judgment: Judgment normal.          Flexible laryngoscopy    Date/Time: 3/29/2023 10:00 AM  Performed by: Saira Tenorio APRN  Authorized by: Saira Tenorio APRN     Procedure details:     Indications: evaluation of larynx      Medication:  Afrin    Instrument: flexible fiberoptic laryngoscope      Scope location: right nare    Nasal cavity:     Right inferior turbinates: normal    Septum:     Deviation: deviated to the left      Severity of deviation: intermediate    Sinus/ Nasopharynx:     Right middle  meatus: normal and patent      Right nasopharynx: normal and patent      Left nasopharynx: normal and patent      Right eustachian tube: normal and patent      Left eustachian tube: normal and patent    Oropharynx/ Supraglottis:     Posterior pharyngeal wall: normal      Oropharynx: normal      Vallecula: normal      Base of tongue: normal      Epiglottis: normal    Larynx/ Hypopharynx:     Arytenoids: normal      Hypopharynx: normal      Pyriform sinus: normal      False vocal cords: normal      True vocal cords: normal    Post-procedure details:     Patient tolerance of procedure:  Tolerated well         Result Review :               Assessment and Plan    Diagnoses and all orders for this visit:    1. Ear pressure, right (Primary)    2. Facial pressure  -     Ambulatory Referral to Neurology    3. Right sided facial pain  -     Ambulatory Referral to Neurology    Other orders  -     $ Laryngoscopy    On examination today I did perform flexible laryngoscopy for evaluation of the larynx which was unremarkable.  I was able to go over the results of the recent CT scan which was also essentially normal ruling out any sinus or ear involvement..  We have discussed that his symptoms may be coming more from a neurological standpoint and I would like to set him up with a neurologist to evaluate his facial pain and pressure symptoms.  He is agreeable to this.       Follow Up   No follow-ups on file.  Patient was given instructions and counseling regarding his condition or for health maintenance advice. Please see specific information pulled into the AVS if appropriate.     WARREN Li

## 2023-04-24 ENCOUNTER — OFFICE VISIT (OUTPATIENT)
Dept: NEUROLOGY | Facility: CLINIC | Age: 58
End: 2023-04-24
Payer: COMMERCIAL

## 2023-04-24 ENCOUNTER — PATIENT ROUNDING (BHMG ONLY) (OUTPATIENT)
Dept: NEUROLOGY | Facility: CLINIC | Age: 58
End: 2023-04-24
Payer: COMMERCIAL

## 2023-04-24 VITALS
WEIGHT: 258.7 LBS | HEIGHT: 73 IN | HEART RATE: 87 BPM | BODY MASS INDEX: 34.28 KG/M2 | SYSTOLIC BLOOD PRESSURE: 147 MMHG | DIASTOLIC BLOOD PRESSURE: 81 MMHG

## 2023-04-24 DIAGNOSIS — G44.84 EXERTIONAL HEADACHE: Primary | ICD-10-CM

## 2023-04-24 DIAGNOSIS — G50.0 RIGHT TRIGEMINAL NEURALGIA: ICD-10-CM

## 2023-04-24 RX ORDER — OXCARBAZEPINE 300 MG/1
300 TABLET, FILM COATED ORAL 2 TIMES DAILY
Qty: 60 TABLET | Refills: 3 | Status: SHIPPED | OUTPATIENT
Start: 2023-04-24

## 2023-04-24 NOTE — PROGRESS NOTES
"Chief Complaint  Neurologic Problem    Subjective          Mahesh Cole presents to [facility]  History of Present Illness  Presents to the office for initial evaluation of right facial pain.  States that he is having severe right facial pain that is exacerbated by bending over, speaking, sneezing.  Endorses right ear pain as well.  Has seen ENT and had a laryngoscopy that was normal and they have referred to neurology to evaluate for trigeminal neuralgia.      Objective   Vital Signs:   /81   Pulse 87   Ht 185.4 cm (73\")   Wt 117 kg (258 lb 11.2 oz)   BMI 34.13 kg/m²     Physical Exam  HENT:      Head: Normocephalic.   Pulmonary:      Effort: Pulmonary effort is normal.   Neurological:      Mental Status: He is alert and oriented to person, place, and time.      Sensory: Sensation is intact.      Motor: Motor function is intact.      Coordination: Coordination is intact.      Deep Tendon Reflexes: Reflexes are normal and symmetric.      Comments: Tenderness to right face on palpation.        Neurologic Exam     Mental Status   Oriented to person, place, and time.        Result Review :               Assessment and Plan    Diagnoses and all orders for this visit:    1. Exertional headache (Primary)  Assessment & Plan:  Will order MRA of the head and neck to further evaluate exertional headache and ensure no vascular origin of symptoms.    Orders:  -     MRI Angiogram Neck Without Contrast; Future  -     MRI Angiogram Head Without Contrast; Future    2. Right trigeminal neuralgia  Assessment & Plan:  Pain is consistent with a right trigeminal neuralgia.  Will order MRI of the internal auditory canals.  Will order labs. We will start oxcarbazepine for neuropathic pain management.    Orders:  -     MRI Internal Auditory Canal With Wo; Future  -     Vitamin B12 & Folate; Future  -     C-reactive Protein; Future  -     Sedimentation Rate; Future  -     OXcarbazepine " (TRILEPTAL) 300 MG tablet; Take 1 tablet by mouth 2 (Two) Times a Day.  Dispense: 60 tablet; Refill: 3    I spent 42 minutes caring for Mahesh on this date of service. This time includes time spent by me in the following activities:preparing for the visit, reviewing tests, obtaining and/or reviewing a separately obtained history, performing a medically appropriate examination and/or evaluation , counseling and educating the patient/family/caregiver, ordering medications, tests, or procedures, documenting information in the medical record and independently interpreting results and communicating that information with the patient/family/caregiver  Follow Up   Return in about 3 months (around 7/24/2023).  Patient was given instructions and counseling regarding his condition or for health maintenance advice. Please see specific information pulled into the AVS if appropriate.

## 2023-04-26 ENCOUNTER — LAB (OUTPATIENT)
Dept: LAB | Facility: HOSPITAL | Age: 58
End: 2023-04-26
Payer: COMMERCIAL

## 2023-04-26 DIAGNOSIS — G50.0 RIGHT TRIGEMINAL NEURALGIA: ICD-10-CM

## 2023-04-26 LAB
CRP SERPL-MCNC: <0.3 MG/DL (ref 0–0.5)
ERYTHROCYTE [SEDIMENTATION RATE] IN BLOOD: 12 MM/HR (ref 0–20)
FOLATE SERPL-MCNC: >20 NG/ML (ref 4.78–24.2)
VIT B12 BLD-MCNC: 331 PG/ML (ref 211–946)

## 2023-04-26 PROCEDURE — 36415 COLL VENOUS BLD VENIPUNCTURE: CPT

## 2023-04-26 PROCEDURE — 82746 ASSAY OF FOLIC ACID SERUM: CPT

## 2023-04-26 PROCEDURE — 82607 VITAMIN B-12: CPT

## 2023-04-26 PROCEDURE — 86140 C-REACTIVE PROTEIN: CPT

## 2023-04-26 PROCEDURE — 85652 RBC SED RATE AUTOMATED: CPT

## 2023-04-27 RX ORDER — MAGNESIUM 200 MG
1000 TABLET ORAL DAILY
Qty: 30 EACH | Refills: 3 | Status: SHIPPED | OUTPATIENT
Start: 2023-04-27

## 2023-05-01 PROBLEM — G50.0 RIGHT TRIGEMINAL NEURALGIA: Status: ACTIVE | Noted: 2023-05-01

## 2023-05-01 PROBLEM — G44.84 EXERTIONAL HEADACHE: Status: ACTIVE | Noted: 2023-05-01

## 2023-05-01 NOTE — ASSESSMENT & PLAN NOTE
Pain is consistent with a right trigeminal neuralgia.  Will order MRI of the internal auditory canals.  Will order labs. We will start oxcarbazepine for neuropathic pain management.

## 2023-05-01 NOTE — ASSESSMENT & PLAN NOTE
Will order MRA of the head and neck to further evaluate exertional headache and ensure no vascular origin of symptoms.

## 2023-05-03 ENCOUNTER — TELEPHONE (OUTPATIENT)
Dept: NEUROLOGY | Facility: CLINIC | Age: 58
End: 2023-05-03
Payer: COMMERCIAL

## 2023-05-03 NOTE — TELEPHONE ENCOUNTER
Caller: ARI     Relationship: [employer]     Best call back number: [phone]    What orders are you requesting (i.e. lab or imaging): MRI A HEAD/NECK WITHOUT     In what timeframe would the patient need to come in: ASAP     Where will you receive your lab/imaging services: [facility]    [phone]        Additional notes: PLACE ORDER TO [facility]     INSURANCE  PRIOR AUTH CODE  [id number] FOR MRI AT [facility]      PLEASE ADVISE

## 2023-06-12 ENCOUNTER — TELEPHONE (OUTPATIENT)
Dept: CARDIOLOGY | Facility: CLINIC | Age: 58
End: 2023-06-12
Payer: COMMERCIAL

## 2023-06-12 NOTE — TELEPHONE ENCOUNTER
THE [facility] RECEIVED A FAX THAT REQUIRES YOUR ATTENTION. THE DOCUMENT HAS BEEN INDEXED TO THE PATIENT'S CHART FOR YOUR REVIEW.      REASON FOR SENDING - EXT MED REC NOTIF      DOCUMENTS DESCRIPTION : POSSIBLE DRUG INTERACTION FOR DILTIAZEM HCL ER  METOPROLOL SUCCINATE ER.      NAME OF SENDER:  JASBIR [facility]     DATE INDEXED: 06/11/23

## 2023-08-31 ENCOUNTER — OFFICE VISIT (OUTPATIENT)
Dept: NEUROLOGY | Facility: CLINIC | Age: 58
End: 2023-08-31
Payer: COMMERCIAL

## 2023-08-31 VITALS
HEART RATE: 77 BPM | BODY MASS INDEX: 34.18 KG/M2 | HEIGHT: 73 IN | WEIGHT: 257.9 LBS | SYSTOLIC BLOOD PRESSURE: 127 MMHG | DIASTOLIC BLOOD PRESSURE: 82 MMHG

## 2023-08-31 DIAGNOSIS — G50.0 RIGHT TRIGEMINAL NEURALGIA: Primary | ICD-10-CM

## 2023-08-31 RX ORDER — OXCARBAZEPINE 300 MG/1
300 TABLET, FILM COATED ORAL 2 TIMES DAILY
Qty: 60 TABLET | Refills: 5 | Status: SHIPPED | OUTPATIENT
Start: 2023-08-31

## 2023-08-31 RX ORDER — MONTELUKAST SODIUM 10 MG/1
10 TABLET ORAL
COMMUNITY
Start: 2023-05-02

## 2023-08-31 NOTE — ASSESSMENT & PLAN NOTE
MRI shows right cerebellar artery that is close to right trigeminal nerve.  Will refer to [facility] for consult. Continue oxcarbazepine for pain control.

## 2023-08-31 NOTE — PROGRESS NOTES
"Chief Complaint  Neurologic Problem    Subjective          Mahesh Cole presents to [facility]  History of Present Illness  Following up to discuss MRI IAC.  Continuing to have some intermittent right facial pain, but symptoms much more managed since staring oxcarbazepine.  Denies side effects.       Interval History:   Presents to the office for initial evaluation of right facial pain.  States that he is having severe right facial pain that is exacerbated by bending over, speaking, sneezing.  Endorses right ear pain as well.  Has seen ENT and had a laryngoscopy that was normal and they have referred to neurology to evaluate for trigeminal neuralgia.    Objective   Vital Signs:   /82   Pulse 77   Ht 185.4 cm (73\")   Wt 117 kg (257 lb 14.4 oz)   BMI 34.03 kg/mý     Physical Exam  HENT:      Head: Normocephalic.   Pulmonary:      Effort: Pulmonary effort is normal.   Neurological:      Mental Status: He is alert and oriented to person, place, and time.      Sensory: Sensation is intact.      Motor: Motor function is intact.      Coordination: Coordination is intact.      Deep Tendon Reflexes: Reflexes are normal and symmetric.      Comments: Tenderness to right face on palpation.      Neurologic Exam     Mental Status   Oriented to person, place, and time.      Result Review :   B12:   Lab Results   Component Value Date    SOECXNNR69 331 04/26/2023      FOLATE:   Lab Results   Component Value Date    FOLATE >20.00 04/26/2023     CRP:   Lab Results   Component Value Date    CRP <0.30 04/26/2023      SED RATE:  Lab Results   Component Value Date    SEDRATE 12 04/26/2023            MRI Brain/IAC:   No acute intracranial abnormality  There is a right superior cerebellar artery branch which comes in close proximity and potentially contacts (abutment not confirmed) the cisternal segment of the 5th cranial nerve.  Normal appearance of the 5th, 7th and 8th cranial nerves " otherwise.   Nonspecific white matter FLAIR signal hyperintensities, overall mild     Assessment and Plan    Diagnoses and all orders for this visit:    1. Right trigeminal neuralgia (Primary)  Assessment & Plan:  MRI shows right cerebellar artery that is close to right trigeminal nerve.  Will refer to [facility] for consult. Continue oxcarbazepine for pain control.     Orders:  -     [facility]  -     OXcarbazepine (TRILEPTAL) 300 MG tablet; Take 1 tablet by mouth 2 (Two) Times a Day.  Dispense: 60 tablet; Refill: 5        Follow Up   Return in about 6 months (around 2/29/2024) for trigeminal nerve.  Patient was given instructions and counseling regarding his condition or for health maintenance advice. Please see specific information pulled into the AVS if appropriate.

## 2023-09-05 ENCOUNTER — TELEPHONE (OUTPATIENT)
Dept: NEUROLOGY | Facility: CLINIC | Age: 58
End: 2023-09-05

## 2023-09-05 NOTE — TELEPHONE ENCOUNTER
Provider: FATOUMATA EATON APRN    Caller: [place]    Relationship to Patient: [facility] RAD/ONCOLOGY    Phone Number: [phone]    Reason for Call: STATED THEY NEED 1ST AND LAST OV NOTES, ANY SCANS PT HAS HAD DONE IN THE LAST 6 MONTHS.  FAX NUMBER [phone]    When was the patient last seen: 8-31-23    PLEASE ADVISE

## 2024-01-06 NOTE — PROGRESS NOTES
Cystoscopy Procedure:  The patients urine was viewe d under a microscope during his clinical visit: no RBC present, no WBC present, no Bacteria present.           PROCEDURE: Flexible cystoscope was passed per urethra into the bladder without difficulty after proper consent.     2cm  prostate    Some scar tissue posteriorly.    No signs of pathology.    No changes from his last scope.  No signs of recurrence    The bladder was inspected in a systematic meridian fashion. There were no tumors, lesions, stones, or other abnormalities noted within the bladder. Of note, there was no increased vascularity as well. Both ureteral orifices were identified and were normal in appearance. The flexible cystoscope was removed. The patient tolerated the procedure well.        History of high risk TCC    11/10/2022 CT uro-- 2 mm nonobstructing stone upper pole right kidney, delayed phase okay, images reviewed    12/21 CT urogram  -delayed phase okay, negative,       12/19 CT abdomen/pelvis with and withoutleft adrenal myolipoma 2.7 cm.  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  Negative otherwise    8/13  pTahigh-grade TCC  4/13  pTa high-grade TCC  2012 pT1 high-grade TCCno muscle    Patient has had 3 induction courses of BCG in the past.    Non-smoker       Nephrolithiasis    Patient does have history of nephrolithiasis, passed a stone last year.    The patient was counseled on the preventative measures of kidney stones today.  This included increasing fluid intake to make at least 1.5 ml daily, decreasing meat intake, decreasing salt intake and taking in a normal amount of calcium (1000 mg daily).  Information handout given on this today.         We also discussed the DASH diet today for stone prevention and handout was given      TCC      > 10 years since initial diagnosis, no recurrences.  After discussion we will place him on yearly cytology and UA with micro.    Follow-up in  1 year with nurse practitioner.  Needs this checked yearly

## 2024-01-08 ENCOUNTER — PROCEDURE VISIT (OUTPATIENT)
Dept: UROLOGY | Facility: CLINIC | Age: 59
End: 2024-01-08
Payer: COMMERCIAL

## 2024-01-08 DIAGNOSIS — Z85.51 HISTORY OF BLADDER CANCER: Primary | ICD-10-CM

## 2024-01-08 PROCEDURE — 52000 CYSTOURETHROSCOPY: CPT | Performed by: UROLOGY

## 2024-02-19 ENCOUNTER — OFFICE VISIT (OUTPATIENT)
Dept: CARDIOLOGY | Facility: CLINIC | Age: 59
End: 2024-02-19
Payer: COMMERCIAL

## 2024-02-19 VITALS
HEART RATE: 83 BPM | DIASTOLIC BLOOD PRESSURE: 84 MMHG | BODY MASS INDEX: 34.19 KG/M2 | WEIGHT: 258 LBS | SYSTOLIC BLOOD PRESSURE: 133 MMHG | HEIGHT: 73 IN

## 2024-02-19 DIAGNOSIS — I49.3 PVC (PREMATURE VENTRICULAR CONTRACTION): Primary | ICD-10-CM

## 2024-02-19 DIAGNOSIS — I10 ESSENTIAL HYPERTENSION: ICD-10-CM

## 2024-02-19 PROCEDURE — 99214 OFFICE O/P EST MOD 30 MIN: CPT | Performed by: INTERNAL MEDICINE

## 2024-02-19 RX ORDER — METOPROLOL SUCCINATE 50 MG/1
50 TABLET, EXTENDED RELEASE ORAL DAILY
COMMUNITY
Start: 2024-02-02

## 2024-02-19 RX ORDER — CYANOCOBALAMIN 1000 UG/ML
100 INJECTION, SOLUTION INTRAMUSCULAR; SUBCUTANEOUS
COMMUNITY
Start: 2024-02-02

## 2024-02-19 RX ORDER — DILTIAZEM HYDROCHLORIDE 180 MG/1
180 CAPSULE, EXTENDED RELEASE ORAL DAILY
Qty: 90 CAPSULE | Refills: 3 | Status: SHIPPED | OUTPATIENT
Start: 2024-02-19

## 2024-02-19 RX ORDER — LOSARTAN POTASSIUM 25 MG/1
25 TABLET ORAL DAILY
COMMUNITY
Start: 2023-12-01

## 2024-02-19 RX ORDER — CLOTRIMAZOLE AND BETAMETHASONE DIPROPIONATE 10; .64 MG/G; MG/G
1 CREAM TOPICAL 2 TIMES DAILY
COMMUNITY
Start: 2024-02-02

## 2024-02-19 RX ORDER — CYCLOSPORINE 0.5 MG/ML
1 EMULSION OPHTHALMIC EVERY 12 HOURS
COMMUNITY

## 2024-02-19 NOTE — PROGRESS NOTES
Chief Complaint  Follow-up, PVC, and Hypertension    Subjective    Patient has been doing well he has had occasional heart palpitations but no sustained tachycardic issues.  Past Medical History:   Diagnosis Date    Cancer 2012    Bladder    Diabetes mellitus 2008    Diabetes mellitus, type II 02/08/2023    Dizziness     Headache     Hx of bladder cancer     Hypertension     PVC (premature ventricular contraction)     Renal lesion     Right trigeminal neuralgia 05/01/2023         Current Outpatient Medications:     cetirizine (zyrTEC) 10 MG tablet, Zyrtec 10 mg oral tablet take 1 tablet (10 mg) by oral route once daily   Active, Disp: , Rfl:     clotrimazole-betamethasone (LOTRISONE) 1-0.05 % cream, Apply 1 Application topically to the appropriate area as directed 2 (Two) Times a Day., Disp: , Rfl:     cyanocobalamin 1000 MCG/ML injection, Inject 0.1 mL into the appropriate muscle as directed by prescriber Every 14 (Fourteen) Days., Disp: , Rfl:     dilTIAZem (TIAZAC) 180 MG 24 hr capsule, Take 1 capsule by mouth Daily., Disp: 90 capsule, Rfl: 3    fluticasone (FLONASE) 50 MCG/ACT nasal spray, Inhale TWO SPRAYS IN EACH NOSTRIL ONCE A DAY AS DIRECTED, Disp: , Rfl:     hydroCHLOROthiazide (MICROZIDE) 12.5 MG capsule, TAKE ONE CAPSULE BY MOUTH ONCE A DAY, Disp: , Rfl:     losartan (COZAAR) 25 MG tablet, Take 1 tablet by mouth Daily., Disp: , Rfl:     metFORMIN (GLUCOPHAGE) 850 MG tablet, metformin 850 mg oral tablet take 1 tablet (850 mg) by oral route 2 times per day with morning and evening meals   Active, Disp: , Rfl:     metoprolol succinate XL (TOPROL-XL) 50 MG 24 hr tablet, Take 1 tablet by mouth Daily., Disp: , Rfl:     multivitamin (THERAGRAN) tablet tablet, , Disp: , Rfl:     naproxen (NAPROSYN) 500 MG tablet, Take 1 tablet by mouth 2 (Two) Times a Day As Needed. for pain, Disp: , Rfl:     Omega-3 Fatty Acids (FISH OIL PO), , Disp: , Rfl:     OXcarbazepine (TRILEPTAL) 300 MG tablet, Take 1 tablet by mouth 2  "(Two) Times a Day., Disp: 60 tablet, Rfl: 5    Restasis 0.05 % ophthalmic emulsion, Administer 1 drop to both eyes Every 12 (Twelve) Hours., Disp: , Rfl:     vitamin D3 125 MCG (5000 UT) capsule capsule, TAKE ONE CAPSULE BY MOUTH ONCE A DAY, Disp: , Rfl:     Medications Discontinued During This Encounter   Medication Reason    montelukast (SINGULAIR) 10 MG tablet *Therapy completed    metoprolol tartrate (LOPRESSOR) 50 MG tablet Alternate therapy    Cyanocobalamin (Vitamin B-12) 1000 MCG sublingual tablet Alternate therapy    dilTIAZem (TIAZAC) 180 MG 24 hr capsule Reorder     No Known Allergies     Social History     Tobacco Use    Smoking status: Never    Smokeless tobacco: Never   Vaping Use    Vaping Use: Never used   Substance Use Topics    Alcohol use: Never    Drug use: Never       Family History   Problem Relation Age of Onset    Hypertension Mother     Diabetes Brother         Objective     /84   Pulse 83   Ht 185.4 cm (73\")   Wt 117 kg (258 lb)   BMI 34.04 kg/m²       Physical Exam    General Appearance:   no acute distress  Alert and oriented x3  HENT:   lips not cyanotic  Atraumatic  Neck:  No jvd   supple  Respiratory:  no respiratory distress  normal breath sounds  no rales  Cardiovascular:  Regular rate and rhythm  no S3, no S4   no murmur  no rub  Extremities  No cyanosis  lower extremity edema: none    Skin:   warm, dry  No rashes      Result Review :     No results found for: \"PROBNP\"                     Diagnoses and all orders for this visit:    1. PVC (premature ventricular contraction) (Primary)  -     dilTIAZem (TIAZAC) 180 MG 24 hr capsule; Take 1 capsule by mouth Daily.  Dispense: 90 capsule; Refill: 3    2. Essential hypertension  Assessment & Plan:  Blood pressure well-controlled continue with diltiazem 180 mg daily, diltiazem 50 daily, and losartan 25 once a day dosing               Follow Up     Return in about 1 year (around 2/19/2025) for Follow with Shanae Choudhary, EKG with " F/U.          Patient was given instructions and counseling regarding his condition or for health maintenance advice. Please see specific information pulled into the AVS if appropriate.

## 2024-02-19 NOTE — ASSESSMENT & PLAN NOTE
Blood pressure well-controlled continue with diltiazem 180 mg daily, diltiazem 50 daily, and losartan 25 once a day dosing

## 2024-02-27 ENCOUNTER — PATIENT MESSAGE (OUTPATIENT)
Dept: NEUROLOGY | Facility: CLINIC | Age: 59
End: 2024-02-27
Payer: COMMERCIAL

## 2024-02-29 ENCOUNTER — OFFICE VISIT (OUTPATIENT)
Dept: NEUROLOGY | Facility: CLINIC | Age: 59
End: 2024-02-29
Payer: COMMERCIAL

## 2024-02-29 VITALS
BODY MASS INDEX: 34.37 KG/M2 | WEIGHT: 259.3 LBS | SYSTOLIC BLOOD PRESSURE: 130 MMHG | HEART RATE: 73 BPM | HEIGHT: 73 IN | DIASTOLIC BLOOD PRESSURE: 86 MMHG

## 2024-02-29 DIAGNOSIS — G50.0 RIGHT TRIGEMINAL NEURALGIA: ICD-10-CM

## 2024-02-29 RX ORDER — OXCARBAZEPINE 300 MG/1
300 TABLET, FILM COATED ORAL 2 TIMES DAILY
Qty: 60 TABLET | Refills: 11 | Status: SHIPPED | OUTPATIENT
Start: 2024-02-29

## 2024-02-29 NOTE — PROGRESS NOTES
"Chief Complaint  Neurologic Problem    Subjective          Mahesh Cole presents to [facility] NEUROLOGY [facility] NEUROSURGERY  History of Present Illness  Following up from trigeminal neuralgia.  Continuing to have some intermittent right facial pain, but symptoms fairly managed with oxcarbazepine.  Denies side effects. Did see radiation oncology at  and is contemplating surgical options.       Interval History:   Presents to the office for initial evaluation of right facial pain.  States that he is having severe right facial pain that is exacerbated by bending over, speaking, sneezing.  Endorses right ear pain as well.  Has seen ENT and had a laryngoscopy that was normal and they have referred to neurology to evaluate for trigeminal neuralgia.      Objective   Vital Signs:   /86   Pulse 73   Ht 185.4 cm (73\")   Wt 118 kg (259 lb 4.8 oz)   BMI 34.21 kg/m²     Physical Exam  HENT:      Head: Normocephalic.   Pulmonary:      Effort: Pulmonary effort is normal.   Neurological:      Mental Status: He is alert and oriented to person, place, and time.      Sensory: Sensation is intact.      Motor: Motor function is intact.      Coordination: Coordination is intact.      Deep Tendon Reflexes: Reflexes are normal and symmetric.      Comments: Tenderness to right face on palpation.        Neurologic Exam     Mental Status   Oriented to person, place, and time.        Result Review :     Data reviewed : Dr. Tavarez, rad onc           Assessment and Plan    Diagnoses and all orders for this visit:    1. Right trigeminal neuralgia  Assessment & Plan:  Continue oxcarbazepine for pain control.     Orders:  -     OXcarbazepine (TRILEPTAL) 300 MG tablet; Take 1 tablet by mouth 2 (Two) Times a Day.  Dispense: 60 tablet; Refill: 11        Follow Up   Return in about 1 year (around 2/28/2025) for trigeminal neuralgia .  Patient was given instructions and counseling regarding his condition or for health " maintenance advice. Please see specific information pulled into the AVS if appropriate.

## 2024-04-17 DIAGNOSIS — G50.0 RIGHT TRIGEMINAL NEURALGIA: ICD-10-CM

## 2025-01-08 NOTE — PROGRESS NOTES
Chief Complaint: History of bladder cancer    Subjective         History of Present Illness  Mahesh Cole is a 59 y.o. male presents to [facility] to be seen for follow-up.    The patient was previously seen by Dr. Bentley Araujo for a history of bladder cancer and nephrolithiasis.  His last visit was on 1/8/2024, at which time he underwent cystoscopy.  Given that it has been over 10 years since his initial diagnosis of bladder cancer, it is recommended that he have annual cytology and urinalysis with microscopy.  He is here today to follow-up.    Denies any bothersome urinary symptoms.   Denies GH.     He does have his PSA checked by his PCP, has never been told it was abnormal    Previous with Dr. Bentley Araujo 1/8/2024:  Cystoscopy Procedure:  The patients urine was viewe d under a microscope during his clinical visit: no RBC present, no WBC present, no Bacteria present.           PROCEDURE: Flexible cystoscope was passed per urethra into the bladder without difficulty after proper consent.     2cm  prostate    Some scar tissue posteriorly.    No signs of pathology.     No changes from his last scope.  No signs of recurrence    The bladder was inspected in a systematic meridian fashion. There were no tumors, lesions, stones, or other abnormalities noted within the bladder. Of note, there was no increased vascularity as well. Both ureteral orifices were identified and were normal in appearance. The flexible cystoscope was removed. The patient tolerated the procedure well.          History of high risk TCC     11/10/2022 CT uro-- 2 mm nonobstructing stone upper pole right kidney, delayed phase okay, images reviewed     12/21 CT urogram  -delayed phase okay, negative,        12/19 CT abdomen/pelvis with and withoutleft adrenal myolipoma 2.7 cm.  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  Negative otherwise    8/13  pTahigh-grade TCC  4/13   pTa high-grade TCC  2012 pT1 high-grade TCCno muscle    Patient has had 3 induction courses of BCG in the past.    Non-smoker         Nephrolithiasis     Patient does have history of nephrolithiasis, passed a stone last year.     The patient was counseled on the preventative measures of kidney stones today.  This included increasing fluid intake to make at least 1.5 ml daily, decreasing meat intake, decreasing salt intake and taking in a normal amount of calcium (1000 mg daily).  Information handout given on this today.           We also discussed the DASH diet today for stone prevention and handout was given        TCC       > 10 years since initial diagnosis, no recurrences.  After discussion we will place him on yearly cytology and UA with micro.     Follow-up in 1 year with nurse practitioner.  Needs this checked yearly       Objective     Past Medical History:   Diagnosis Date    Cancer 2012    Bladder    Diabetes mellitus 2008    Diabetes mellitus, type II 02/08/2023    Dizziness     Headache     Hx of bladder cancer     Hypertension     PVC (premature ventricular contraction)     Renal lesion     Right trigeminal neuralgia 05/01/2023       Past Surgical History:   Procedure Laterality Date    BLADDER SURGERY           Current Outpatient Medications:     cetirizine (zyrTEC) 10 MG tablet, Zyrtec 10 mg oral tablet take 1 tablet (10 mg) by oral route once daily   Active, Disp: , Rfl:     clotrimazole-betamethasone (LOTRISONE) 1-0.05 % cream, Apply 1 Application topically to the appropriate area as directed 2 (Two) Times a Day., Disp: , Rfl:     cyanocobalamin 1000 MCG/ML injection, Inject 0.1 mL into the appropriate muscle as directed by prescriber Every 14 (Fourteen) Days., Disp: , Rfl:     dilTIAZem (TIAZAC) 180 MG 24 hr capsule, Take 1 capsule by mouth Daily., Disp: 90 capsule, Rfl: 3    fluticasone (FLONASE) 50 MCG/ACT nasal spray, Inhale TWO SPRAYS IN EACH NOSTRIL ONCE A DAY AS DIRECTED, Disp: , Rfl:      "glyburide (DIAbeta) 2.5 MG tablet, Take 1 tablet by mouth Daily., Disp: , Rfl:     hydroCHLOROthiazide (MICROZIDE) 12.5 MG capsule, TAKE ONE CAPSULE BY MOUTH ONCE A DAY, Disp: , Rfl:     losartan (COZAAR) 25 MG tablet, Take 1 tablet by mouth Daily., Disp: , Rfl:     metFORMIN (GLUCOPHAGE) 850 MG tablet, metformin 850 mg oral tablet take 1 tablet (850 mg) by oral route 2 times per day with morning and evening meals   Active, Disp: , Rfl:     metoprolol succinate XL (TOPROL-XL) 50 MG 24 hr tablet, Take 1 tablet by mouth Daily., Disp: , Rfl:     multivitamin (THERAGRAN) tablet tablet, , Disp: , Rfl:     naproxen (NAPROSYN) 500 MG tablet, Take 1 tablet by mouth 2 (Two) Times a Day As Needed. for pain, Disp: , Rfl:     Omega-3 Fatty Acids (FISH OIL PO), , Disp: , Rfl:     OXcarbazepine (TRILEPTAL) 300 MG tablet, Take 1 tablet by mouth 2 (Two) Times a Day., Disp: 60 tablet, Rfl: 11    Restasis 0.05 % ophthalmic emulsion, Administer 1 drop to both eyes Every 12 (Twelve) Hours., Disp: , Rfl:     vitamin D3 125 MCG (5000 UT) capsule capsule, TAKE ONE CAPSULE BY MOUTH ONCE A DAY, Disp: , Rfl:     No Known Allergies     Family History   Problem Relation Age of Onset    Hypertension Mother     Diabetes Brother        Social History     Socioeconomic History    Marital status:    Tobacco Use    Smoking status: Never     Passive exposure: Never    Smokeless tobacco: Never   Vaping Use    Vaping status: Never Used   Substance and Sexual Activity    Alcohol use: Never    Drug use: Never    Sexual activity: Not Currently     Partners: Female     Birth control/protection: Pill       Vital Signs:   Resp 14   Ht 185.4 cm (73\")   Wt 118 kg (260 lb 2.3 oz)   BMI 34.32 kg/m²      Physical Exam  Vitals reviewed.   Constitutional:       Appearance: Normal appearance.   Neurological:      General: No focal deficit present.      Mental Status: He is alert and oriented to person, place, and time.   Psychiatric:         Mood and " Affect: Mood normal.         Behavior: Behavior normal.          Result Review :   The following data was reviewed by: WARREN Andrew on 01/10/2025:  Results for orders placed or performed in visit on 01/10/25   Bladder Scan    Collection Time: 01/10/25  8:27 AM   Result Value Ref Range    Urine Volume 0       Bladder Scan interpretation 01/10/2025    Estimation of residual urine via BVI 3000 Verathon Bladder Scan  MA/nurse performing: Chuyita SHAFER MA  Residual Urine: 0 ml  Indication: History of bladder cancer   Position: Supine  Examination: Incremental scanning of the suprapubic area using 2.0 MHz transducer using copious amounts of acoustic gel.   Findings: An anechoic area was demonstrated which represented the bladder, with measurement of residual urine as noted. I inspected this myself. In that the residual urine was stable or insignificant, refer to plan for treatment and plan necessary at this time.           Procedures        Assessment and Plan    Diagnoses and all orders for this visit:    1. History of bladder cancer (Primary)  -     Bladder Scan  -     Urinalysis With Microscopic - Urine, Clean Catch; Future  -     Cytology, Urine; Future    Given that he is doing well we will see him back in 1 year with microscopy and cytology.  We will let him know when we have results available of the current test.    See him back in 1 year or sooner for new concerns.    Follow Up   No follow-ups on file.  Patient was given instructions and counseling regarding his condition or for health maintenance advice. Please see specific information pulled into the AVS if appropriate.         This document has been electronically signed by WARREN Andrew  January 10, 2025 08:39 EST

## 2025-01-10 ENCOUNTER — OFFICE VISIT (OUTPATIENT)
Dept: UROLOGY | Age: 60
End: 2025-01-10
Payer: COMMERCIAL

## 2025-01-10 VITALS — HEIGHT: 73 IN | WEIGHT: 260.14 LBS | RESPIRATION RATE: 14 BRPM | BODY MASS INDEX: 34.48 KG/M2

## 2025-01-10 DIAGNOSIS — Z85.51 HISTORY OF BLADDER CANCER: Primary | ICD-10-CM

## 2025-01-10 LAB
BACTERIA UR QL AUTO: NORMAL /HPF
BILIRUB UR QL STRIP: NEGATIVE
CLARITY UR: CLEAR
COLOR UR: YELLOW
GLUCOSE UR STRIP-MCNC: NEGATIVE MG/DL
HGB UR QL STRIP.AUTO: NEGATIVE
HYALINE CASTS UR QL AUTO: NORMAL /LPF
KETONES UR QL STRIP: NEGATIVE
LEUKOCYTE ESTERASE UR QL STRIP.AUTO: NEGATIVE
NITRITE UR QL STRIP: NEGATIVE
PH UR STRIP.AUTO: 5.5 [PH] (ref 5–8)
PROT UR QL STRIP: NEGATIVE
RBC # UR STRIP: NORMAL /HPF
REF LAB TEST METHOD: NORMAL
SP GR UR STRIP: 1.03 (ref 1–1.03)
SQUAMOUS #/AREA URNS HPF: NORMAL /HPF
URINE VOLUME: 0
UROBILINOGEN UR QL STRIP: NORMAL
WBC # UR STRIP: NORMAL /HPF

## 2025-01-10 PROCEDURE — 81001 URINALYSIS AUTO W/SCOPE: CPT | Performed by: NURSE PRACTITIONER

## 2025-01-10 PROCEDURE — 88108 CYTOPATH CONCENTRATE TECH: CPT | Performed by: NURSE PRACTITIONER

## 2025-01-10 RX ORDER — GLYBURIDE 2.5 MG/1
1 TABLET ORAL DAILY
COMMUNITY
Start: 2025-01-07

## 2025-01-15 LAB
CYTO UR: NORMAL
LAB AP CASE REPORT: NORMAL
LAB AP CLINICAL INFORMATION: NORMAL
LAB AP NON-GYN INTERPRETATION: NORMAL
PATH REPORT.FINAL DX SPEC: NORMAL
PATH REPORT.GROSS SPEC: NORMAL

## 2025-02-24 ENCOUNTER — OFFICE VISIT (OUTPATIENT)
Dept: CARDIOLOGY | Facility: CLINIC | Age: 60
End: 2025-02-24
Payer: COMMERCIAL

## 2025-02-24 VITALS
DIASTOLIC BLOOD PRESSURE: 86 MMHG | HEART RATE: 80 BPM | SYSTOLIC BLOOD PRESSURE: 129 MMHG | BODY MASS INDEX: 34.22 KG/M2 | WEIGHT: 258.2 LBS | HEIGHT: 73 IN

## 2025-02-24 DIAGNOSIS — I49.3 PVC (PREMATURE VENTRICULAR CONTRACTION): Primary | ICD-10-CM

## 2025-02-24 DIAGNOSIS — I10 ESSENTIAL HYPERTENSION: ICD-10-CM

## 2025-02-24 PROCEDURE — 99214 OFFICE O/P EST MOD 30 MIN: CPT | Performed by: NURSE PRACTITIONER

## 2025-02-24 RX ORDER — DILTIAZEM HYDROCHLORIDE 180 MG/1
180 CAPSULE, EXTENDED RELEASE ORAL DAILY
Qty: 90 CAPSULE | Refills: 3 | Status: SHIPPED | OUTPATIENT
Start: 2025-02-24

## 2025-02-24 NOTE — PROGRESS NOTES
Chief Complaint  Follow-up, PVC, and Hypertension    Subjective            History of Present Illness  Mahesh Cole is a 60-year-old male patient who presents to the office today for follow-up.    History of Present Illness  The patient presents for a follow-up visit regarding PVCs and high blood pressure.    He is currently on a daily regimen of diltiazem 180 mg for the management of PVCs, which he reports tolerating well.    His hypertension is being managed with hydrochlorothiazide 12.5 mg, losartan 25 mg, and metoprolol 50 mg, all administered daily. He reports no adverse effects from these medications.    He underwent blood work in November 2024, following which his diabetic medication was reduced due to a decrease in his A1c levels from 10 to 6. He attributes this improvement to significant lifestyle modifications, including dietary changes and acupuncture treatments over the summer. He has eliminated red meat from his diet, although he occasionally consumes it in small quantities without any adverse effects.     He participated in a screening program through his insurance company, which identified a potential circulatory issue in his left leg. However, he reports no associated pain or swelling.    MEDICATIONS  Diltiazem, hydrochlorothiazide, losartan, metoprolol        PMH  Past Medical History:   Diagnosis Date    Cancer 2012    Bladder    Diabetes mellitus 2008    Diabetes mellitus, type II 02/08/2023    Dizziness     Headache     Hx of bladder cancer     Hypertension     PVC (premature ventricular contraction)     Renal lesion     Right trigeminal neuralgia 05/01/2023         ALLERGY  No Known Allergies       SURGICALHX  Past Surgical History:   Procedure Laterality Date    BLADDER SURGERY            SOC  Social History     Socioeconomic History    Marital status:    Tobacco Use    Smoking status: Never     Passive exposure: Never    Smokeless tobacco: Never   Vaping Use    Vaping status: Never  Used   Substance and Sexual Activity    Alcohol use: Never    Drug use: Never    Sexual activity: Not Currently     Partners: Female     Birth control/protection: Pill         FAMHX  Family History   Problem Relation Age of Onset    Hypertension Mother     Diabetes Brother           ROGER  Current Outpatient Medications on File Prior to Visit   Medication Sig    cetirizine (zyrTEC) 10 MG tablet Zyrtec 10 mg oral tablet take 1 tablet (10 mg) by oral route once daily   Active    clotrimazole-betamethasone (LOTRISONE) 1-0.05 % cream Apply 1 Application topically to the appropriate area as directed 2 (Two) Times a Day.    cyanocobalamin 1000 MCG/ML injection Inject 0.1 mL into the appropriate muscle as directed by prescriber Every 14 (Fourteen) Days.    dilTIAZem (TIAZAC) 180 MG 24 hr capsule Take 1 capsule by mouth Daily.    fluticasone (FLONASE) 50 MCG/ACT nasal spray Inhale TWO SPRAYS IN EACH NOSTRIL ONCE A DAY AS DIRECTED    glyburide (DIAbeta) 2.5 MG tablet Take 1 tablet by mouth Daily.    hydroCHLOROthiazide (MICROZIDE) 12.5 MG capsule TAKE ONE CAPSULE BY MOUTH ONCE A DAY    losartan (COZAAR) 25 MG tablet Take 1 tablet by mouth Daily.    metFORMIN (GLUCOPHAGE) 850 MG tablet metformin 850 mg oral tablet take 1 tablet (850 mg) by oral route 2 times per day with morning and evening meals   Active    metoprolol succinate XL (TOPROL-XL) 50 MG 24 hr tablet Take 1 tablet by mouth Daily.    multivitamin (THERAGRAN) tablet tablet     naproxen (NAPROSYN) 500 MG tablet Take 1 tablet by mouth 2 (Two) Times a Day As Needed. for pain    Omega-3 Fatty Acids (FISH OIL PO)     OXcarbazepine (TRILEPTAL) 300 MG tablet Take 1 tablet by mouth 2 (Two) Times a Day.    Restasis 0.05 % ophthalmic emulsion Administer 1 drop to both eyes Every 12 (Twelve) Hours.    vitamin D3 125 MCG (5000 UT) capsule capsule TAKE ONE CAPSULE BY MOUTH ONCE A DAY     No current facility-administered medications on file prior to visit.         Objective  "  /86   Pulse 80   Ht 185.4 cm (72.99\")   Wt 117 kg (258 lb 3.2 oz)   BMI 34.07 kg/m²       Physical Exam  HENT:      Head: Normocephalic.   Neck:      Vascular: No carotid bruit.   Cardiovascular:      Rate and Rhythm: Normal rate and regular rhythm.      Pulses: Normal pulses.      Heart sounds: Normal heart sounds. No murmur heard.  Pulmonary:      Effort: Pulmonary effort is normal.      Breath sounds: Normal breath sounds.   Musculoskeletal:      Cervical back: Neck supple.      Right lower leg: No edema.      Left lower leg: No edema.   Skin:     General: Skin is dry.   Neurological:      Mental Status: He is alert and oriented to person, place, and time.   Psychiatric:         Behavior: Behavior normal.         Result Review :   The following data was reviewed by: WARREN Malagon on 02/24/2025:  No results found for: \"PROBNP\"     No results found for: \"TSH\"   No results found for: \"FREET4\"   No results found for: \"DDIMERQUANT\"  No results found for: \"MG\"   No results found for: \"DIGOXIN\"   No results found for: \"TROPONINT\"             Assessment and Plan    Diagnoses and all orders for this visit:    1. Essential hypertension (Primary)    2. PVC (premature ventricular contraction)        Assessment & Plan  1. Premature ventricular contractions (PVCs).  His blood pressure and heart rate are within normal limits during this office visit. He will continue with the current medication regimen, including diltiazem 180 mg daily and metoprolol 50 mg daily, as long as he remains asymptomatic.    2. Hypertension.  His blood pressure is well-controlled with the current medications. He will continue taking hydrochlorothiazide 12.5 mg daily and losartan 25 mg daily. A request for the most recent lab results will be sent to PCP.    3. Diabetes Mellitus.  His A1c has improved significantly, dropping from 10 to 6. He will continue with the current management plan, including lifestyle modifications and " medication adjustments made by PCP.    4. Circulatory issue in the left lower extremity.  He reports no pain or swelling in the left leg. He will provide a copy of the screening report for further evaluation. Good blood pressure control, daily baby aspirin, and cholesterol management are recommended to prevent plaque buildup.      Follow Up   Return in about 1 year (around 2/24/2026) for Follow up with Dr Swanson.    Patient was given instructions and counseling regarding his condition or for health maintenance advice. Please see specific information pulled into the AVS if appropriate.     Mahesh Coel  reports that he has never smoked. He has never been exposed to tobacco smoke. He has never used smokeless tobacco.          Patient or patient representative verbalized consent for the use of Ambient Listening during the visit with  WARREN Malagon for chart documentation. 2/24/2025  10:39 EST    WARREN Malagon  02/24/25  08:49 EST    Dictated Utilizing Dragon Dictation

## 2025-03-05 ENCOUNTER — OFFICE VISIT (OUTPATIENT)
Dept: NEUROLOGY | Facility: CLINIC | Age: 60
End: 2025-03-05
Payer: COMMERCIAL

## 2025-03-05 VITALS
BODY MASS INDEX: 34.52 KG/M2 | SYSTOLIC BLOOD PRESSURE: 130 MMHG | HEIGHT: 73 IN | WEIGHT: 260.5 LBS | DIASTOLIC BLOOD PRESSURE: 88 MMHG | HEART RATE: 82 BPM

## 2025-03-05 DIAGNOSIS — G50.0 RIGHT TRIGEMINAL NEURALGIA: Primary | ICD-10-CM

## 2025-03-05 RX ORDER — LOSARTAN POTASSIUM 50 MG/1
50 TABLET ORAL DAILY
COMMUNITY
Start: 2025-03-04

## 2025-03-05 RX ORDER — OXCARBAZEPINE 300 MG/1
300 TABLET, FILM COATED ORAL 2 TIMES DAILY
Qty: 60 TABLET | Refills: 11 | Status: SHIPPED | OUTPATIENT
Start: 2025-03-05

## 2025-03-05 NOTE — PROGRESS NOTES
"Chief Complaint  Neurologic Problem    Subjective          Mahesh Cole presents to [facility]  History of Present Illness    History of Present Illness  The patient is a 60-year-old male who presents to the office for follow-up for trigeminal neuralgia, remaining on oxcarbazepine.    Facial pain has been relatively stable with the current regimen of oxcarbazepine. Intermittent twinges of pain are experienced towards the end of each dose cycle, but overall management with the medication is reported as satisfactory. Recently, similar symptoms have begun on the contralateral side, described as a sensation akin to a \"tickling in my ear,\" mirroring the initial presentation of symptoms on the affected side. Satisfaction with the current dosage of oxcarbazepine is expressed, with no perceived need for an increase at this time. Curiosity about the potential benefits of dry needling or acupuncture as a complementary treatment for his condition is noted.    INTERVAL: Since the last visit, the patient reports stable management of facial pain with oxcarbazepine, with new symptoms emerging on the contralateral side. No changes in medication or additional hospital visits are mentioned.    MEDICATIONS  oxcarbazepine       Objective   Vital Signs:   /88   Pulse 82   Ht 185.4 cm (72.99\")   Wt 118 kg (260 lb 8 oz)   BMI 34.38 kg/m²     Physical Exam  HENT:      Head: Normocephalic.   Pulmonary:      Effort: Pulmonary effort is normal.   Neurological:      Mental Status: He is alert and oriented to person, place, and time.      Sensory: Sensation is intact.      Motor: Motor function is intact.      Coordination: Coordination is intact.      Deep Tendon Reflexes: Reflexes are normal and symmetric.        Neurological Exam  Mental Status  Alert. Oriented to person, place, and time.    Sensory  Normal sensation.    Reflexes  Deep tendon reflexes are 2+ and symmetric in all four " extremities.    Coordination    Finger-to-nose, rapid alternating movements and heel-to-shin normal bilaterally without dysmetria.      Result Review :     Data reviewed : Lab studies: CBC, CMP., TSH           Assessment and Plan    Diagnoses and all orders for this visit:    1. Right trigeminal neuralgia (Primary)  -     OXcarbazepine (TRILEPTAL) 300 MG tablet; Take 1 tablet by mouth 2 (Two) Times a Day.  Dispense: 60 tablet; Refill: 11        Assessment & Plan  1. Trigeminal Neuralgia.  His condition is currently well-managed with the existing dosage of oxcarbazepine. He reports occasional twinges and new symptoms on the other side of his face, which will be monitored. Sodium, kidney, and liver function tests were normal in the fall. He has been advised to consider acupuncture at [facility] as a potential therapeutic option, although it is not covered by insurance and not offered in the office. A prescription refill for oxcarbazepine has been provided. No additional blood work is required at this time. If his symptoms worsen or become unmanageable, he should inform the office immediately for potential medication adjustment.    Follow-up  The patient will follow up in 1 year, or earlier if necessary.         Follow Up   Return in about 1 year (around 3/5/2026).  Patient was given instructions and counseling regarding his condition or for health maintenance advice. Please see specific information pulled into the AVS if appropriate.       Patient or patient representative verbalized consent for the use of Ambient Listening during the visit with  WARREN Albert for chart documentation. 3/7/2025  08:05 EST